# Patient Record
Sex: FEMALE | Race: WHITE | Employment: FULL TIME | URBAN - METROPOLITAN AREA
[De-identification: names, ages, dates, MRNs, and addresses within clinical notes are randomized per-mention and may not be internally consistent; named-entity substitution may affect disease eponyms.]

---

## 2023-05-02 ENCOUNTER — HOSPITAL ENCOUNTER (EMERGENCY)
Facility: CLINIC | Age: 58
Discharge: HOME | End: 2023-05-02
Attending: EMERGENCY MEDICINE
Payer: COMMERCIAL

## 2023-05-02 ENCOUNTER — APPOINTMENT (OUTPATIENT)
Dept: GENERAL RADIOLOGY | Facility: CLINIC | Age: 58
End: 2023-05-02
Payer: COMMERCIAL

## 2023-05-02 ENCOUNTER — APPOINTMENT (OUTPATIENT)
Dept: CT IMAGING | Facility: CLINIC | Age: 58
End: 2023-05-02
Payer: COMMERCIAL

## 2023-05-02 VITALS
WEIGHT: 143 LBS | DIASTOLIC BLOOD PRESSURE: 87 MMHG | HEIGHT: 64 IN | HEART RATE: 79 BPM | SYSTOLIC BLOOD PRESSURE: 134 MMHG | OXYGEN SATURATION: 95 % | RESPIRATION RATE: 19 BRPM | BODY MASS INDEX: 24.41 KG/M2 | TEMPERATURE: 98.7 F

## 2023-05-02 DIAGNOSIS — R20.0 NUMBNESS: Primary | ICD-10-CM

## 2023-05-02 DIAGNOSIS — J18.9 PNEUMONIA OF LEFT LOWER LOBE DUE TO INFECTIOUS ORGANISM: ICD-10-CM

## 2023-05-02 PROCEDURE — 72125 CT NECK SPINE W/O DYE: CPT

## 2023-05-02 PROCEDURE — 71045 X-RAY EXAM CHEST 1 VIEW: CPT

## 2023-05-02 PROCEDURE — 99284 EMERGENCY DEPT VISIT MOD MDM: CPT

## 2023-05-02 PROCEDURE — 70450 CT HEAD/BRAIN W/O DYE: CPT

## 2023-05-02 RX ORDER — ALBUTEROL SULFATE 90 UG/1
2 AEROSOL, METERED RESPIRATORY (INHALATION) 4 TIMES DAILY
Qty: 18 G | Refills: 0 | Status: SHIPPED | OUTPATIENT
Start: 2023-05-02

## 2023-05-02 RX ORDER — DOXYCYCLINE 100 MG/1
100 TABLET ORAL 2 TIMES DAILY
Qty: 20 TABLET | Refills: 0 | Status: SHIPPED | OUTPATIENT
Start: 2023-05-02 | End: 2023-05-12

## 2023-05-02 ASSESSMENT — LIFESTYLE VARIABLES
HOW OFTEN DO YOU HAVE A DRINK CONTAINING ALCOHOL: 2-4 TIMES A MONTH
HOW MANY STANDARD DRINKS CONTAINING ALCOHOL DO YOU HAVE ON A TYPICAL DAY: 1 OR 2

## 2023-05-02 ASSESSMENT — PAIN - FUNCTIONAL ASSESSMENT: PAIN_FUNCTIONAL_ASSESSMENT: 0-10

## 2023-05-02 ASSESSMENT — PAIN SCALES - GENERAL: PAINLEVEL_OUTOF10: 0

## 2023-05-02 NOTE — PROGRESS NOTES
Dr. Madelaine Bradshaw at bedside to discuss results, [address] [place] [address] plan, and decision to D/C home.

## 2023-05-02 NOTE — ED PROVIDER NOTES
[facility]  [facility]  Phone: [address]      Pt Name: Deena Granado  MRN: [id number]  [place] 1965  Date of evaluation: 5/2/2023    CHIEF COMPLAINT       Chief Complaint   Patient presents with    Hand Numbness     L sided hand numbness- improving         HISTORY OF PRESENT ILLNESS    Deena Granado is a 60 y.o. female who presents to the emergency department complaining of left hand numbness that started this morning when she woke up. Went to bed feeling fine she is right-handed. She describes a stocking glove type numbness from her wrist distally. She says it feels like needles and pins. Denies weakness. No headache currently but did have a mild headache frontal yesterday that was 2 out of 10 no history of migraines. No fevers or chills she has chronic neck pain. Denies any other focal neurologic complaints. No blurry vision or double vision. She says her numbness has markedly improved since she woke up but still present. Denies repetitive use of left hand    Patient also concerned because last August she had a small spot of pneumonia on her left lower lung and was requesting a repeat x-ray. She is developing a cough she is a smoker. REVIEW OF SYSTEMS       Constitutional: No fevers or chills   HEENT: No sore throat, rhinorrhea, or earache   Eyes: No blurry vision or double vision no drainage   Cardiovascular: No chest pain or tachycardia   Respiratory: No wheezing or shortness of breath positive cough   Gastrointestinal: No nausea, vomiting, diarrhea, constipation, or abdominal pain   : No hematuria or dysuria   Musculoskeletal: No swelling or pain   Skin: No rash  Neurological: Positive left hand numbness no weakness    PAST MEDICAL HISTORY    has no past medical history on file. SURGICAL HISTORY      has a past surgical history that includes Cholecystectomy and thyroglossal duct excision.     CURRENT MEDICATIONS

## 2023-05-02 NOTE — DISCHARGE INSTRUCTIONS
Follow-up with family physician may need MRI or EMG for numbness. Take medications as prescribed  Return immediately if any worsening symptoms or any other concerns    Tell us how we did visit: [email or web address]   and let us know about your experience

## 2023-05-02 NOTE — PROGRESS NOTES
PT arrives to ED with C/O L hand numbness. Woke up with L hand numbness. Denies any pain. Reports numbness slightly improving. No other C/O at this time. Denies any known injury. Has not tried any [place] options for symptom relief. No other C/O at this time. Call light within reach. Will continue to monitor.